# Patient Record
Sex: FEMALE | Race: WHITE | URBAN - METROPOLITAN AREA
[De-identification: names, ages, dates, MRNs, and addresses within clinical notes are randomized per-mention and may not be internally consistent; named-entity substitution may affect disease eponyms.]

---

## 2019-02-27 LAB — MAMMOGRAM: NORMAL

## 2020-01-24 ENCOUNTER — TRANSFERRED RECORDS (OUTPATIENT)
Dept: HEALTH INFORMATION MANAGEMENT | Facility: CLINIC | Age: 70
End: 2020-01-24

## 2020-01-30 ENCOUNTER — TRANSCRIBE ORDERS (OUTPATIENT)
Dept: OTHER | Age: 70
End: 2020-01-30

## 2020-01-30 DIAGNOSIS — J45.51 SEVERE PERSISTENT ASTHMA WITH ACUTE EXACERBATION (H): Primary | ICD-10-CM

## 2020-01-30 DIAGNOSIS — J96.01 ACUTE RESPIRATORY FAILURE WITH HYPOXIA (H): ICD-10-CM

## 2020-02-12 DIAGNOSIS — J45.909 ASTHMA: Primary | ICD-10-CM

## 2020-02-12 NOTE — TELEPHONE ENCOUNTER
RECORDS RECEIVED FROM: in process    DATE RECEIVED: 5/4/20    NOTES STATUS DETAILS   OFFICE NOTE from referring provider Care Everywhere 1.30.20 Sheila Rahman- [facility]    OFFICE NOTE from other specialist N/A    DISCHARGE SUMMARY from hospital N/A    DISCHARGE REPORT from the ER Care Everywhere 1.24.20 shira [facility]   12.25.19- temitope - [facility]   10.31.19 ihsan [facility]   MEDICATION LIST N/A    IMAGING  (NEED IMAGES AND REPORTS)     CT SCAN Care Everywhere    CHEST XRAY (CXR) Care Everywhere [facility]:  2/12/20, 1.24.20, 12.2.19, 9/10/19, 7/16/19, 6.11.19, more in [facility]    TESTS     PULMONARY FUNCTION TESTING (PFT) In process 5/4/20- scheduled        Action 2.12.20 sv   Action Taken Records request to [facility] for  CXR- 1.24.20, 12.2.19, 9/10/19, 7/16/19, 6.11.19, 4/7/19, 2/16/19, 12.11.18, 7/17/18   Message sent to nurse to place PFT/CXR order      Action 2.25.20 sv   Action Taken Received reports, request sent again for images      Imaging Received  4/21/20 MV 10.44am  [facility]   Image Type (x): Disc:   PACS: x   Exam Date/Name XR Chest 2/12/20  XR Chest 1.24.20  XR Chest 12.2.19  XR Chest 9/10/19   XR Chest 7/16/19   XR Chest 6.11.19  XR Chest 4/7/1  XR Chest  2/16/19 XR Chest 12.11.18  XR Chest 7/17/18  Comments: images resolved in PACS

## 2020-03-30 ENCOUNTER — DOCUMENTATION ONLY (OUTPATIENT)
Dept: CARE COORDINATION | Facility: CLINIC | Age: 70
End: 2020-03-30

## 2020-05-04 ENCOUNTER — PRE VISIT (OUTPATIENT)
Dept: PULMONOLOGY | Facility: CLINIC | Age: 70
End: 2020-05-04

## 2020-05-08 ENCOUNTER — VIRTUAL VISIT (OUTPATIENT)
Dept: PULMONOLOGY | Facility: CLINIC | Age: 70
End: 2020-05-08
Attending: STUDENT IN AN ORGANIZED HEALTH CARE EDUCATION/TRAINING PROGRAM

## 2020-05-08 DIAGNOSIS — J45.909 SEVERE ASTHMA, UNSPECIFIED WHETHER COMPLICATED, UNSPECIFIED WHETHER PERSISTENT: Primary | ICD-10-CM

## 2020-05-08 RX ORDER — ESCITALOPRAM OXALATE 20 MG/1
20 TABLET ORAL
COMMUNITY
Start: 2019-03-13

## 2020-05-08 RX ORDER — FLUTICASONE PROPIONATE 220 UG/1
2 AEROSOL, METERED RESPIRATORY (INHALATION)
COMMUNITY
Start: 2019-09-10

## 2020-05-08 RX ORDER — OMEPRAZOLE 40 MG/1
40 CAPSULE, DELAYED RELEASE ORAL
COMMUNITY
Start: 2019-11-13

## 2020-05-08 RX ORDER — MONTELUKAST SODIUM 10 MG/1
10 TABLET ORAL
COMMUNITY
Start: 2019-10-31 | End: 2020-10-30

## 2020-05-08 RX ORDER — SUMATRIPTAN 50 MG/1
50 TABLET, FILM COATED ORAL
COMMUNITY
Start: 2019-10-08

## 2020-05-08 RX ORDER — INHALER, ASSIST DEVICES
SPACER (EA) MISCELLANEOUS
COMMUNITY
Start: 2019-06-10

## 2020-05-08 RX ORDER — IBUPROFEN 200 MG
200-800 TABLET ORAL
COMMUNITY

## 2020-05-08 RX ORDER — TIOTROPIUM BROMIDE INHALATION SPRAY 3.12 UG/1
2 SPRAY, METERED RESPIRATORY (INHALATION)
COMMUNITY
Start: 2019-10-31

## 2020-05-08 RX ORDER — ARFORMOTEROL TARTRATE 15 UG/2ML
15 SOLUTION RESPIRATORY (INHALATION)
COMMUNITY
Start: 2020-02-19 | End: 2021-02-18

## 2020-05-08 RX ORDER — VITAMIN B COMPLEX
1000 TABLET ORAL
COMMUNITY

## 2020-05-08 RX ORDER — ALBUTEROL SULFATE 90 UG/1
2 AEROSOL, METERED RESPIRATORY (INHALATION)
COMMUNITY
Start: 2019-09-10

## 2020-05-08 RX ORDER — IPRATROPIUM BROMIDE AND ALBUTEROL SULFATE 2.5; .5 MG/3ML; MG/3ML
3 SOLUTION RESPIRATORY (INHALATION)
COMMUNITY
Start: 2019-09-10 | End: 2021-02-17

## 2020-05-08 RX ORDER — BUDESONIDE 0.5 MG/2ML
0.5 INHALANT ORAL
COMMUNITY
Start: 2020-02-19 | End: 2021-02-18

## 2020-05-08 RX ORDER — LEVOCETIRIZINE DIHYDROCHLORIDE 5 MG/1
5 TABLET, FILM COATED ORAL
COMMUNITY
Start: 2019-12-05

## 2020-05-08 RX ORDER — BUPROPION HYDROCHLORIDE 150 MG/1
150 TABLET ORAL
COMMUNITY
Start: 2019-10-30

## 2020-05-08 NOTE — PROGRESS NOTES
Pulmonary Clinic Note   5/8/2020     PCP: No primary care provider on file.    Reason for visit: TELEPHONE VISIT  Telephone visit    Pulmonary HPI:     70-year-old female followed in pulmonary clinic for severe persistent asthma on fesenera (benralizumab), Brovana and Pulmicort nebs, Spiriva, and montelukast.    Patient has had asthma for over 30 years.  Previously seen by an outside pulmonologist in [place].  Was previously ordered is currently on the medications listed below.  fesenra (not taking anymore... last dose was February)  albuterol  duoneb  spriva  flovent  brovana    Patient is seeking a second opinion regarding her asthma and management.  She has had multiple exacerbations requiring emergency room visit with and without admission over the past 6 months.  Her most recent admission was in late April.  She was admitted overnight and discharged the next morning.  When she does get short of breath and feels like she is having an asthma attack, she takes her duo nebs and albuterol.  Sometimes these are not effective.  She has also had times where she was able to get herself out her episode without any other intervention through breathing exercises she read a magazine.  She has seen ENT in the past, this was when she lost her voice for 7 months.  This was a few years back and there was no diagnosis.  She was diagnosed with GERD at the time and she has been on omeprazole since with good control.  She denies any postnasal drip symptoms.  We had a long discussion about vocal cord dysfunction and the possibility that that is present.  After hearing more about it the patient does feel that some of these episodes are different from her true asthma.  Patient has gotten exhausted with the inability to control her symptoms and is hopeful this will help.  We discussed speech therapy and will go from here.    Patient is a non smoke. non EtOH use. no recreational drug use.   Patient has no pets.   Patient lives in a  apartment.    Patient's outside records were reviewed via Care Everywhere &/or available paper records (sent for scanning).   Tobacco Use     Smoking status: Passive Smoke Exposure - Never Smoker     Smokeless tobacco: Never Used   Substance Use Topics     Alcohol use: No     Drug use: No      Review of systems: a complete 12-point ROS conducted, & found to be negative w/ exceptions as noted in the HPI.    Past medical history:  Severe asthma   GERD  Depression    Social history:  Social History     Tobacco Use     Smoking status: Not on file   Substance Use Topics     Alcohol use: Not on file     Drug use: Not on file       Family history:  No family history on file.    Medications:  Current Outpatient Medications   Medication     albuterol (PROAIR HFA/PROVENTIL HFA/VENTOLIN HFA) 108 (90 Base) MCG/ACT inhaler     arformoterol (BROVANA) 15 MCG/2ML NEBU neb solution     budesonide (PULMICORT) 0.5 MG/2ML neb solution     buPROPion (WELLBUTRIN XL) 150 MG 24 hr tablet     escitalopram (LEXAPRO) 20 MG tablet     fluticasone (FLOVENT HFA) 220 MCG/ACT inhaler     ibuprofen (ADVIL/MOTRIN) 200 MG tablet     ipratropium - albuterol 0.5 mg/2.5 mg/3 mL (DUONEB) 0.5-2.5 (3) MG/3ML neb solution     levocetirizine (XYZAL) 5 MG tablet     montelukast (SINGULAIR) 10 MG tablet     omeprazole (PRILOSEC) 40 MG DR capsule     spacer (OPTICHAMBER TIKA) holding chamber     SUMAtriptan (IMITREX) 50 MG tablet     tiotropium (SPIRIVA RESPIMAT) 2.5 MCG/ACT inhaler     Vitamin D3 (VITAMIN D3) 25 mcg (1000 units) tablet     No current facility-administered medications for this visit.        Allergies:  Allergies   Allergen Reactions     Sulfa Drugs Rash       Labs: reviewed in [employer] & personally interpreted.   Elevated eos in the past    Imaging: reviewed in EPIC & personally interpreted. Below are the interpretations from the formal Radiology review.  CTA 4/2020 no PE    PFT:  2016  INTERPRETATION:  The vital capacity is normal.  The FEV1 is  "moderately reduced at 1.66 liters or 64% of predicted.  The FEV1/FVC ratio and expiratory flows are moderately reduced.  There is significant improvement in spirometry following inhalation of a bronchodilator.  Lung volumes by body plethysmography are normal.   Airway resistance is increased and specific conductance reduced consistent with an obstructive defect.  The diffusing capacity is normal at 20.07 or 80% of predicted.  The contour of the flow volume loop is consistent with an obstructive defect.  The patient's effort is acceptable.      Impression & recommendations:      Severe Asthma  Asthma for > 30 years. Currently \"uncontrolled\" given the multiple ER visits in the last few months. Was on fesenra along with Spiriva, flovent, brovana, motelukast + Duonebs and albuterol PRN. Stopped fesenra 2/2020. PFTs and long standing history of asthma is not in question however the severity and control might be. Concern that patient has vocal cord dysfunction as well given she does have rapid resolution (at times), uses breathing to get out of severe exacerbations (at times) all point to some episodes more likely being VCD than asthma. If true she may be well controlled with her asthma and we can deescalate in the future. Need to have patient seen speech therapy as soon as possible. Given lack of recent PFTs and labs we will wait to change medications till we obtain these and this will give us time to pursue VCD therapy.   **GERD controlled  **No PND symptoms  **will eval for CHF (echo)   - speech therapy   - follows with allergy   - continue omeprazole   - echocardiogram (BNP elevation on recent labs)   - IgE   - CBC diff   - PFTs with [facility]    Lolis was seen today for consult.    Diagnoses and all orders for this visit:    Severe asthma, unspecified whether complicated, unspecified whether persistent  -     SPEECH THERAPY REFERRAL; Future  -     CBC with platelets differential; Future  -     General PFT Lab (Please " always keep checked); Future  -     Pulmonary Function Test; Future  -     6 minute walk test; Future  -     IgE; Future  -     Cancel: Echocardiogram Complete; Future  -     Echocardiogram Complete; Future    -Patient's overall symptoms appeared to be asthma with overlap of COPD, no role for Xolair to this patient's IgE level is normal, and allergens may be specific to patient's home since most exacerbations happen when she is at home.     A total of 40 minutes was spent through chart review and discussions with patient and staff.    RTC in 2 months w/ PFT and labs as above should be done prior    Patient was seen & discussed w/ Dr. Manuel MD, who is in agreement.    Chidi Cagle MD  Pulmonary & Critical Care  [phone]    Faculty Addendum:  This patient has been seen and evaluated by me.  I have discussed care with Dr. Cagle and agree with the findings and plan in this note.    Esvin Jackson MD  Pulmonary/Critical Care  May 8, 2020 5:08 PM

## 2020-09-04 ENCOUNTER — TELEPHONE (OUTPATIENT)
Dept: PULMONOLOGY | Facility: CLINIC | Age: 70
End: 2020-09-04

## 2025-02-26 ENCOUNTER — MEDICAL CORRESPONDENCE (OUTPATIENT)
Dept: HEALTH INFORMATION MANAGEMENT | Facility: CLINIC | Age: 75
End: 2025-02-26
Payer: COMMERCIAL

## 2025-02-27 ENCOUNTER — TRANSCRIBE ORDERS (OUTPATIENT)
Dept: OTHER | Age: 75
End: 2025-02-27

## 2025-02-27 DIAGNOSIS — J44.89 COPD WITH ASTHMA (H): ICD-10-CM

## 2025-02-27 DIAGNOSIS — J82.83 EOSINOPHILIC ASTHMA: Primary | ICD-10-CM

## 2025-03-06 ENCOUNTER — TELEPHONE (OUTPATIENT)
Dept: ORTHOPEDICS | Facility: CLINIC | Age: 75
End: 2025-03-06
Payer: COMMERCIAL

## 2025-03-06 DIAGNOSIS — Z98.890 S/P ORIF (OPEN REDUCTION INTERNAL FIXATION) FRACTURE: Primary | ICD-10-CM

## 2025-03-06 DIAGNOSIS — Z87.81 S/P ORIF (OPEN REDUCTION INTERNAL FIXATION) FRACTURE: Primary | ICD-10-CM

## 2025-03-06 NOTE — TELEPHONE ENCOUNTER
Other: Son Kenyon called to cancel appointment for 3/7 they are having a hard time getting patient transportation from facility tomorrow son would like to speak to care team about rescheduling so he can take her      Could we send this information to you in [facility] or would you prefer to receive a phone call?:   Patient would prefer a phone call   Okay to leave a detailed message?: Yes at Other phone number:  [phone]

## 2025-03-06 NOTE — TELEPHONE ENCOUNTER
Patient's son was called and was assisted in rescheduling appt with Hannah.     Marbin Sutton CMA

## 2025-03-10 ENCOUNTER — TELEPHONE (OUTPATIENT)
Dept: ORTHOPEDICS | Facility: CLINIC | Age: 75
End: 2025-03-10
Payer: COMMERCIAL

## 2025-03-10 NOTE — TELEPHONE ENCOUNTER
Patient sister confirmed scheduled appointment:  Date: 3/20/2025  Time: 2:40pm  Visit type: POST OP MSK  Provider: Eugenia Ayala  Location: [facility] [place]